# Patient Record
Sex: FEMALE | Race: WHITE
[De-identification: names, ages, dates, MRNs, and addresses within clinical notes are randomized per-mention and may not be internally consistent; named-entity substitution may affect disease eponyms.]

---

## 2018-05-01 NOTE — EDPHY
H & P


Time Seen by Provider: 04/30/18 21:52


HPI/ROS: 





CHIEF COMPLAINT:  Fever, vomiting, sore throat





HISTORY OF PRESENT ILLNESS:  17-year-old female presents to the emergency 

department with fever, 1 episode of vomiting and sore throat over last 2 days.  

She denies cough or shortness of breath.  She has had subjective fevers.  She 

denies diarrhea.  Denies headache.  No known ill contacts.  No recent travel.  

No treatment of her symptoms.





REVIEW OF SYSTEMS:


Constitutional:  Fever as above


Eyes:  No double or blurry vision.


ENT:  sore throat.


Respiratory:  No cough, no shortness of breath.


Cardiac:  No chest pain.


Gastrointestinal:  Vomiting.  No abdominal pain or diarrhea


Genitourinary:  No dysuria.


Musculoskeletal:  No neck or back pain.


Skin:  No rashes.


Neurological:  No headache.


Past Medical/Surgical History: 





Negative


Social History: 





[employer] student


Smoking Status: Never smoked


Physical Exam: 





General Appearance:  Alert, no distress.  Patient heart rate of 124. 

Temperature 37.1 degrees.  96% on room air.  


Eyes:  Pupils equal and round.  Extraocular motions are all intact.


ENT:  Mouth:  Mucous membranes moist.  Mild posterior pharyngeal injection.


Respiratory:  No wheezing, rhonchi, or rales, lungs are clear to auscultation.


Cardiovascular:  Regular rate and rhythm.


Gastrointestinal:  Abdomen is soft and nontender, no masses, no rebound or 

guarding, bowel sounds normal.


Neurological:  Alert and oriented x 3, cranial nerves II through XII grossly 

intact


Skin:  Warm and dry, no rashes.


Musculoskeletal:  Nontender to palpate along the cervical, thoracic or lumbar 

spine.  Neck is supple.


Extremities:  Full range of motion and no peripheral edema.


Psychiatric:  Patient is oriented X 3, there is no agitation.


Constitutional: 


 Initial Vital Signs











Temperature (C)  37.1 C   04/30/18 21:34


 


Heart Rate  124 H  04/30/18 21:34


 


Respiratory Rate  16   04/30/18 21:34


 


Blood Pressure  120/88 H  04/30/18 21:34


 


O2 Sat (%)  96   04/30/18 21:34








 











O2 Delivery Mode               Room Air














Allergies/Adverse Reactions: 


 





No Known Allergies Allergy (Unverified 04/30/18 21:33)


 








Home Medications: 














 Medication  Instructions  Recorded


 


NK [No Known Home Meds]  04/30/18














Medical Decision Making


ED Course/Re-evaluation: 





17-year-old female presents with sore throat, feeling dehydrated and fever.  

She did not have a fever in the emergency department.  She was tachycardic.  

She had a rapid strep test which was negative.  She was treated with IV normal 

saline.  She was feeling much better.  She is comfortable being discharged 

home.  I do not think additional laboratory studies are indicated.


Differential Diagnosis: 





Including but not limited to strep pharyngitis, mononucleosis, peritonsillar 

abscess, viral syndrome, dehydration





- Data Points


Medications Given: 


 








Discontinued Medications





Acetaminophen (Tylenol)  650 mg PO EDNOW ONE


   Stop: 04/30/18 21:53


   Last Admin: 04/30/18 22:11 Dose:  650 mg


Sodium Chloride (Ns)  1,000 mls @ 0 mls/hr IV ONCE ONE


   PRN Reason: Wide Open


   Stop: 04/30/18 22:39


   Last Admin: 04/30/18 23:01 Dose:  1,000 mls


Ondansetron HCl (Zofran Odt)  4 mg PO EDNOW ONE


   Stop: 04/30/18 21:53


   Last Admin: 04/30/18 22:11 Dose:  4 mg








Departure





- Departure


Disposition: Home, Routine, Self-Care


Clinical Impression: 


 Sore throat, Dehydration





Condition: Good


Instructions:  Dehydration (ED), Pharyngitis (ED)


Additional Instructions: 


Adult Pain & Fever Control:


We recommend Acetaminophen (Tylenol) and Ibuprofen (Motrin,Advil) for pain and 

fever control.  When fever is high or pain severe, both drugs can be used at 

the same time, but at different intervals.  Please note the time differences.  


Your dose is:     Acetaminophen 1000mg every 4 to 6 hours


        Ibuprofen 600mg every 8 hours 





Call [phone] for the results of your throat culture in 48 hr.  Return if 

you feel worse in any way.


Referrals: 


Riri Vale DO [Doctor of Osteopathy] - 2-3 days, if not improved (Primary 

care provider on call)


Stand Alone Forms:  School Excuse

## 2018-09-16 ENCOUNTER — HOSPITAL ENCOUNTER (EMERGENCY)
Dept: HOSPITAL 80 - FED | Age: 18
Discharge: HOME | End: 2018-09-16
Payer: COMMERCIAL

## 2018-09-16 VITALS — SYSTOLIC BLOOD PRESSURE: 95 MMHG | DIASTOLIC BLOOD PRESSURE: 55 MMHG

## 2018-09-16 DIAGNOSIS — R11.2: Primary | ICD-10-CM

## 2018-09-16 DIAGNOSIS — E86.9: ICD-10-CM

## 2018-09-16 LAB — PLATELET # BLD: 278 10^3/UL (ref 150–400)

## 2018-09-16 NOTE — EDPHY
H & P


Stated Complaint: vomiting


Time Seen by Provider: 09/16/18 06:28


HPI/ROS: 





HPI





CHIEF COMPLAINT:  Nausea and vomiting after drinking too much alcohol





HISTORY OF PRESENT ILLNESS:  This is a 18-year-old female, she is otherwise 

healthy, denies any significant medical history presents emergency room with 

nausea vomiting after drinking alcohol last night.  Patient reports that she 

drank large amount of vodka last night.  States around 1:00 a.m. She began 

vomiting.  Multiple times.  Denies chest pain or shortness of breath.  States 

she thinks she drank too much alcohol.  Continues to have nausea so decided 

come the emergency room.





Past Medical History:  Denies significant medical history





Past Surgical History:  Denies significant surgical history





Social History:  [employer] student, large amount of alcohol last 

night.





Family History:  Noncontributory








ROS   


REVIEW OF SYSTEMS:


10 Systems were reviewed and negative with the exception of the elements 

mentioned in the history of present illness.








Exam   


Constitutional nontoxic,  triage nursing summary reviewed, vital signs reviewed

, awake/alert. 


Eyes   normal conjunctivae and sclera, EOMI, PERRLA. 


HENT   normal inspection, atraumatic, moist mucus membranes, no epistaxis, neck 

supple/ no meningismus, no raccoon eyes. 


Respiratory   clear to auscultation bilaterally, normal breath sounds, no 

respiratory distress, no wheezing. 


Cardiovascular   rate normal, regular rhythm, no murmur, no edema, distal 

pulses normal. 


Gastrointestinal   soft, non-tender, no rebound, no guarding, normal bowel 

sounds, no distension, no pulsatile mass. 


Genitourinary   no CVA tenderness. 


Musculoskeletal  no midline vertebral tenderness, full range of motion, no calf 

swelling, no tenderness of extremities, no meningismus, good pulses, 

neurovascularly intact.


Skin   pink, warm, & dry, no rash, skin atraumatic. 


Neurologic   awake, alert and oriented x 3, AAOx3, moves all 4 extremities 

equally, motor intact, sensory intact, CN II-XII intact, normal cerebellar, 

normal vision, normal speech. 


Psychiatric   normal mood/affect. 


Heme/Lymph/Immune   no lymphadenopathy.





Differential diagnosis includes but is not limited to and in no particular order

:  Alcohol-induced pancreatitis, alcohol-induced gastritis, dehydration, 

alcohol intoxication,  Bowel obstruction, appendicitis, gallbladder disease, 

diverticulitis, colitis, enteritis, perforated viscus, gastritis, GERD, 

esophagitis, urinary tract infection, pyelonephritis, kidney stones





Medical Decision Making:  Plan for this patient IV establishment IV fluid bolus

, IV Zofran nausea, IV Pepcid for GI upset, check basic blood work.  Re-evaluate





Re-evaluation:











0715:  Patient re-evaluated this time resting comfortably no acute distress.  

Abdomen is soft nontender.  She is not vomiting.  She feels better after IV 

fluids nausea medicine and Pepcid.





I do recommend she has a clear liquids today in a very bland diet.





Additionally recommend she does not drink large amounts of alcohol.





Additionally return precautions discussed with her.


Source: Patient





- Personal History


LMP (Females 10-55): 15-21 Days Ago


Current Tetanus/Diphtheria Vaccine: Yes


Current Tetanus Diphtheria and Acellular Pertussis (TDAP): Yes





- Medical/Surgical History


Hx Asthma: No


Hx Chronic Respiratory Disease: No


Hx Diabetes: No


Hx Cardiac Disease: No


Hx Renal Disease: No


Hx Cirrhosis: No


Hx Alcoholism: No


Hx HIV/AIDS: No


Hx Splenectomy or Spleen Trauma: No


Other PMH: denies





- Social History


Smoking Status: Never smoked


Constitutional: 


 Initial Vital Signs











Temperature (C)  36.7 C   09/16/18 06:24


 


Heart Rate  99   09/16/18 06:24


 


Respiratory Rate  16   09/16/18 06:24


 


Blood Pressure  120/77   09/16/18 06:24


 


O2 Sat (%)  97   09/16/18 06:24








 











O2 Delivery Mode               Room Air














Allergies/Adverse Reactions: 


 





amoxicillin Allergy (Verified 09/16/18 06:27)


 








Home Medications: 














 Medication  Instructions  Recorded


 


NK [No Known Home Meds]  04/30/18














Medical Decision Making





- Data Points


Laboratory Results: 


 Laboratory Results





 09/16/18 06:41 





 09/16/18 06:41 





 











  09/16/18 09/16/18 09/16/18





  06:41 06:41 06:41


 


WBC      11.13 10^3/uL H 10^3/uL





     (3.80-9.50) 


 


RBC      4.81 10^6/uL 10^6/uL





     (4.18-5.33) 


 


Hgb      14.2 g/dL g/dL





     (12.6-16.3) 


 


Hct      41.2 % %





     (38.0-47.0) 


 


MCV      85.7 fL fL





     (81.5-99.8) 


 


MCH      29.5 pg pg





     (27.9-34.1) 


 


MCHC      34.5 g/dL g/dL





     (32.4-36.7) 


 


RDW      11.8 % %





     (11.5-15.2) 


 


Plt Count      278 10^3/uL 10^3/uL





     (150-400) 


 


MPV      10.0 fL fL





     (8.7-11.7) 


 


Neut % (Auto)      82.5 % H %





     (39.3-74.2) 


 


Lymph % (Auto)      11.3 % L %





     (15.0-45.0) 


 


Mono % (Auto)      4.5 % %





     (4.5-13.0) 


 


Eos % (Auto)      0.4 % L %





     (0.6-7.6) 


 


Baso % (Auto)      0.9 % %





     (0.3-1.7) 


 


Nucleat RBC Rel Count      0.0 % %





     (0.0-0.2) 


 


Absolute Neuts (auto)      9.19 10^3/uL H 10^3/uL





     (1.70-6.50) 


 


Absolute Lymphs (auto)      1.26 10^3/uL 10^3/uL





     (1.00-3.00) 


 


Absolute Monos (auto)      0.50 10^3/uL 10^3/uL





     (0.30-0.80) 


 


Absolute Eos (auto)      0.04 10^3/uL 10^3/uL





     (0.03-0.40) 


 


Absolute Basos (auto)      0.10 10^3/uL 10^3/uL





     (0.02-0.10) 


 


Absolute Nucleated RBC      0.00 10^3/uL 10^3/uL





     (0-0.01) 


 


Immature Gran %      0.4 % %





     (0.0-1.1) 


 


Immature Gran #      0.04 10^3/uL 10^3/uL





     (0.00-0.10) 


 


Sodium    144 mEq/L mEq/L  





    (135-145)  


 


Potassium    4.2 mEq/L mEq/L  





    (3.3-5.0)  


 


Chloride    107 mEq/L mEq/L  





    ()  


 


Carbon Dioxide    22 mEq/l mEq/l  





    (22-31)  


 


Anion Gap    15 mEq/L mEq/L  





    (8-16)  


 


BUN    11 mg/dL mg/dL  





    (7-23)  


 


Creatinine    0.5 mg/dL L mg/dL  





    (0.6-1.0)  


 


Estimated GFR    > 60   





    


 


Glucose    90 mg/dL mg/dL  





    ()  


 


Calcium    10.2 mg/dL mg/dL  





    (8.5-10.4)  


 


Total Bilirubin    0.3 mg/dL mg/dL  





    (0.1-1.4)  


 


Conjugated Bilirubin    0.2 mg/dL mg/dL  





    (0.0-0.5)  


 


Unconjugated Bilirubin    0.1 mg/dL mg/dL  





    (0.0-1.1)  


 


AST    26 IU/L IU/L  





    (14-46)  


 


ALT    26 IU/L IU/L  





    (9-52)  


 


Alkaline Phosphatase    61 IU/L IU/L  





    ()  


 


Total Protein    8.8 g/dL H g/dL  





    (6.3-8.2)  


 


Albumin    5.1 g/dL H g/dL  





    (3.5-5.0)  


 


Lipase    82 IU/L IU/L  





    ()  


 


Beta HCG, Qual  NEGATIVE     





    











Medications Given: 


 








Discontinued Medications





Sodium Chloride (Ns)  1,000 mls @ 0 mls/hr IV EDNOW ONE; Wide Open


   PRN Reason: Protocol


   Stop: 09/16/18 06:29


   Last Admin: 09/16/18 06:47 Dose:  1,000 mls


Famotidine/Sodium Chloride (Pepcid 20 Mg (Premix))  50 mls @ 200 mls/hr IV 

EDNOW ONE


   Stop: 09/16/18 06:42


   Last Admin: 09/16/18 06:47 Dose:  50 mls


Ondansetron HCl (Zofran)  4 mg IVP EDNOW ONE


   Stop: 09/16/18 06:29


   Last Admin: 09/16/18 06:48 Dose:  4 mg








Departure





- Departure


Disposition: Home, Routine, Self-Care


Clinical Impression: 


Vomiting


Qualifiers:


 Vomiting type: unspecified Vomiting Intractability: non-intractable Nausea 

presence: with nausea Qualified Code(s): R11.2 - Nausea with vomiting, 

unspecified





Condition: Good


Instructions:  Acute Nausea and Vomiting (ED)


Referrals: 


NONE *PRIMARY CARE P,. [Primary Care Provider] - As per Instructions